# Patient Record
Sex: FEMALE | Race: WHITE | NOT HISPANIC OR LATINO | URBAN - METROPOLITAN AREA
[De-identification: names, ages, dates, MRNs, and addresses within clinical notes are randomized per-mention and may not be internally consistent; named-entity substitution may affect disease eponyms.]

---

## 2023-09-12 ENCOUNTER — APPOINTMENT (RX ONLY)
Dept: URBAN - METROPOLITAN AREA CLINIC 339 | Facility: CLINIC | Age: 18
Setting detail: DERMATOLOGY
End: 2023-09-12

## 2023-09-12 DIAGNOSIS — L20.89 OTHER ATOPIC DERMATITIS: ICD-10-CM

## 2023-09-12 DIAGNOSIS — L30.9 DERMATITIS, UNSPECIFIED: ICD-10-CM

## 2023-09-12 PROCEDURE — 11104 PUNCH BX SKIN SINGLE LESION: CPT

## 2023-09-12 PROCEDURE — ? BIOPSY BY PUNCH METHOD

## 2023-09-12 PROCEDURE — ? BIOPSY BY PUNCH METHOD FOR DIF

## 2023-09-12 PROCEDURE — ? ADDITIONAL NOTES

## 2023-09-12 PROCEDURE — 99203 OFFICE O/P NEW LOW 30 MIN: CPT | Mod: 25

## 2023-09-12 PROCEDURE — 11103 TANGNTL BX SKIN EA SEP/ADDL: CPT

## 2023-09-12 PROCEDURE — ? BIOPSY BY SHAVE METHOD

## 2023-09-12 PROCEDURE — ? COUNSELING

## 2023-09-12 PROCEDURE — 11105 PUNCH BX SKIN EA SEP/ADDL: CPT

## 2023-09-12 ASSESSMENT — LOCATION DETAILED DESCRIPTION DERM
LOCATION DETAILED: RIGHT KNEE
LOCATION DETAILED: RIGHT POPLITEAL SKIN
LOCATION DETAILED: RIGHT PROXIMAL PRETIBIAL REGION

## 2023-09-12 ASSESSMENT — LOCATION SIMPLE DESCRIPTION DERM
LOCATION SIMPLE: RIGHT KNEE
LOCATION SIMPLE: RIGHT POPLITEAL SKIN
LOCATION SIMPLE: RIGHT PRETIBIAL REGION

## 2023-09-12 ASSESSMENT — LOCATION ZONE DERM: LOCATION ZONE: LEG

## 2023-09-12 NOTE — PROCEDURE: BIOPSY BY PUNCH METHOD FOR DIF
Path Notes (To The Dermatopathologist): ***DR. JACKI CONTRERAS TO READ***\\nQUEST ACCOUNT#: [id number]\\nTEST CODE 20064 Path Notes (To The Dermatopathologist): ***DR. JACKI CONTRERAS TO READ***\\nQUEST ACCOUNT#: [id number]\\nTEST CODE 73955

## 2023-09-12 NOTE — HPI: RASH
What Type Of Note Output Would You Prefer (Optional)?: Bullet Format
Is This A New Presentation, Or A Follow-Up?: Rash
Additional History: Rash appeared approx 12 days ago , pt cant’t can’t think of any triggers only possibly stress. Pt has was seen by her her PCP yesterday  and given rx for clotrimazole/betamethasone cream. Rash is in the form of blisters that area painful at times. Pt has been feeling fine physically since rash started.

## 2023-09-12 NOTE — PROCEDURE: ADDITIONAL NOTES
Additional Notes: Patient denies exposures, spills, trauma, or bites in this area. She does swim in [facility] regularly. She returned from a trip in [place] in august, before these lesions appeared. She was on keflex for dental work, but lesions presented before keflex initiation. Patient denies constitutional symptoms, new sex partners, pain in joints or muscles, dietary changes, etc. Additional Notes: Patient denies exposures, spills, trauma, or bites in this area. She does swim in [facility] regularly. She returned from a trip in [place] in august, before these lesions appeared. She was on keflex for dental work, but lesions presented before keflex initiation. Patient denies constitutional symptoms, new sex partners, pain in joints or muscles, dietary changes, etc.